# Patient Record
Sex: MALE | Race: WHITE
[De-identification: names, ages, dates, MRNs, and addresses within clinical notes are randomized per-mention and may not be internally consistent; named-entity substitution may affect disease eponyms.]

---

## 2017-03-19 ENCOUNTER — HOSPITAL ENCOUNTER (INPATIENT)
Dept: HOSPITAL 8 - ED | Age: 57
LOS: 2 days | Discharge: HOME | DRG: 175 | End: 2017-03-21
Attending: HOSPITALIST | Admitting: HOSPITALIST
Payer: OTHER GOVERNMENT

## 2017-03-19 VITALS — HEIGHT: 73 IN | WEIGHT: 315 LBS | BODY MASS INDEX: 41.75 KG/M2

## 2017-03-19 VITALS — DIASTOLIC BLOOD PRESSURE: 66 MMHG | SYSTOLIC BLOOD PRESSURE: 139 MMHG

## 2017-03-19 DIAGNOSIS — D68.69: ICD-10-CM

## 2017-03-19 DIAGNOSIS — Z95.5: ICD-10-CM

## 2017-03-19 DIAGNOSIS — J98.11: ICD-10-CM

## 2017-03-19 DIAGNOSIS — Z79.82: ICD-10-CM

## 2017-03-19 DIAGNOSIS — E78.5: ICD-10-CM

## 2017-03-19 DIAGNOSIS — N18.2: ICD-10-CM

## 2017-03-19 DIAGNOSIS — I25.10: ICD-10-CM

## 2017-03-19 DIAGNOSIS — J96.00: ICD-10-CM

## 2017-03-19 DIAGNOSIS — I26.99: Primary | ICD-10-CM

## 2017-03-19 DIAGNOSIS — E44.1: ICD-10-CM

## 2017-03-19 DIAGNOSIS — E66.01: ICD-10-CM

## 2017-03-19 DIAGNOSIS — I13.10: ICD-10-CM

## 2017-03-19 DIAGNOSIS — I77.819: ICD-10-CM

## 2017-03-19 DIAGNOSIS — Z79.4: ICD-10-CM

## 2017-03-19 DIAGNOSIS — E11.65: ICD-10-CM

## 2017-03-19 DIAGNOSIS — I25.2: ICD-10-CM

## 2017-03-19 LAB
AST SERPL-CCNC: 29 U/L (ref 15–37)
BUN SERPL-MCNC: 17 MG/DL (ref 7–18)
HGB BLD-MCNC: 15.9 G/DL (ref 13.7–18)
IS PT STATUS REG ER OR PRE ER?: NO
IS PT STATUS REG ER OR PRE ER?: YES

## 2017-03-19 PROCEDURE — 85520 HEPARIN ASSAY: CPT

## 2017-03-19 PROCEDURE — 85670 THROMBIN TIME PLASMA: CPT

## 2017-03-19 PROCEDURE — 85610 PROTHROMBIN TIME: CPT

## 2017-03-19 PROCEDURE — 36415 COLL VENOUS BLD VENIPUNCTURE: CPT

## 2017-03-19 PROCEDURE — 86147 CARDIOLIPIN ANTIBODY EA IG: CPT

## 2017-03-19 PROCEDURE — 85025 COMPLETE CBC W/AUTO DIFF WBC: CPT

## 2017-03-19 PROCEDURE — 85598 HEXAGNAL PHOSPH PLTLT NEUTRL: CPT

## 2017-03-19 PROCEDURE — 83735 ASSAY OF MAGNESIUM: CPT

## 2017-03-19 PROCEDURE — 96374 THER/PROPH/DIAG INJ IV PUSH: CPT

## 2017-03-19 PROCEDURE — 85732 THROMBOPLASTIN TIME PARTIAL: CPT

## 2017-03-19 PROCEDURE — 85730 THROMBOPLASTIN TIME PARTIAL: CPT

## 2017-03-19 PROCEDURE — 86146 BETA-2 GLYCOPROTEIN ANTIBODY: CPT

## 2017-03-19 PROCEDURE — 93970 EXTREMITY STUDY: CPT

## 2017-03-19 PROCEDURE — 84484 ASSAY OF TROPONIN QUANT: CPT

## 2017-03-19 PROCEDURE — 71010: CPT

## 2017-03-19 PROCEDURE — C8929 TTE W OR WO FOL WCON,DOPPLER: HCPCS

## 2017-03-19 PROCEDURE — 85306 CLOT INHIBIT PROT S FREE: CPT

## 2017-03-19 PROCEDURE — 80048 BASIC METABOLIC PNL TOTAL CA: CPT

## 2017-03-19 PROCEDURE — 83880 ASSAY OF NATRIURETIC PEPTIDE: CPT

## 2017-03-19 PROCEDURE — 71275 CT ANGIOGRAPHY CHEST: CPT

## 2017-03-19 PROCEDURE — 85303 CLOT INHIBIT PROT C ACTIVITY: CPT

## 2017-03-19 PROCEDURE — 85613 RUSSELL VIPER VENOM DILUTED: CPT

## 2017-03-19 PROCEDURE — 83036 HEMOGLOBIN GLYCOSYLATED A1C: CPT

## 2017-03-19 PROCEDURE — 80053 COMPREHEN METABOLIC PANEL: CPT

## 2017-03-19 PROCEDURE — 93005 ELECTROCARDIOGRAM TRACING: CPT

## 2017-03-19 PROCEDURE — 81241 F5 GENE: CPT

## 2017-03-19 PROCEDURE — 85379 FIBRIN DEGRADATION QUANT: CPT

## 2017-03-19 PROCEDURE — 82962 GLUCOSE BLOOD TEST: CPT

## 2017-03-20 VITALS — DIASTOLIC BLOOD PRESSURE: 80 MMHG | SYSTOLIC BLOOD PRESSURE: 125 MMHG

## 2017-03-20 VITALS — DIASTOLIC BLOOD PRESSURE: 71 MMHG | SYSTOLIC BLOOD PRESSURE: 105 MMHG

## 2017-03-20 VITALS — SYSTOLIC BLOOD PRESSURE: 119 MMHG | DIASTOLIC BLOOD PRESSURE: 82 MMHG

## 2017-03-20 VITALS — SYSTOLIC BLOOD PRESSURE: 101 MMHG | DIASTOLIC BLOOD PRESSURE: 74 MMHG

## 2017-03-20 LAB
AST SERPL-CCNC: 33 U/L (ref 15–37)
BUN SERPL-MCNC: 20 MG/DL (ref 7–18)
HGB BLD-MCNC: 15.2 G/DL (ref 13.7–18)
IS PT STATUS REG ER OR PRE ER?: NO

## 2017-03-20 RX ADMIN — INSULIN ASPART SCH UNITS: 100 INJECTION, SOLUTION INTRAVENOUS; SUBCUTANEOUS at 09:20

## 2017-03-20 RX ADMIN — INSULIN ASPART SCH UNITS: 100 INJECTION, SOLUTION INTRAVENOUS; SUBCUTANEOUS at 11:53

## 2017-03-20 RX ADMIN — HYDROCHLOROTHIAZIDE SCH MG: 12.5 CAPSULE ORAL at 09:23

## 2017-03-20 RX ADMIN — INSULIN ASPART SCH UNITS: 100 INJECTION, SOLUTION INTRAVENOUS; SUBCUTANEOUS at 17:45

## 2017-03-20 RX ADMIN — DOCUSATE SODIUM 50MG AND SENNOSIDES 8.6MG SCH TAB: 8.6; 5 TABLET, FILM COATED ORAL at 09:23

## 2017-03-20 RX ADMIN — SODIUM CHLORIDE, PRESERVATIVE FREE SCH ML: 5 INJECTION INTRAVENOUS at 00:33

## 2017-03-20 RX ADMIN — RIVAROXABAN SCH MG: 15 TABLET, FILM COATED ORAL at 11:52

## 2017-03-20 RX ADMIN — VALSARTAN SCH MG: 80 TABLET ORAL at 10:10

## 2017-03-20 RX ADMIN — ASPIRIN SCH MG: 81 TABLET, COATED ORAL at 09:23

## 2017-03-20 RX ADMIN — FAMOTIDINE SCH MG: 20 TABLET, FILM COATED ORAL at 00:32

## 2017-03-20 RX ADMIN — INSULIN ASPART SCH UNITS: 100 INJECTION, SOLUTION INTRAVENOUS; SUBCUTANEOUS at 22:48

## 2017-03-20 RX ADMIN — METOPROLOL TARTRATE SCH MG: 25 TABLET, FILM COATED ORAL at 09:23

## 2017-03-20 RX ADMIN — SIMVASTATIN SCH MG: 20 TABLET, FILM COATED ORAL at 00:32

## 2017-03-20 RX ADMIN — SODIUM CHLORIDE, PRESERVATIVE FREE SCH ML: 5 INJECTION INTRAVENOUS at 21:32

## 2017-03-20 RX ADMIN — SIMVASTATIN SCH MG: 20 TABLET, FILM COATED ORAL at 21:31

## 2017-03-20 RX ADMIN — ATORVASTATIN CALCIUM SCH MG: 80 TABLET, FILM COATED ORAL at 09:23

## 2017-03-20 RX ADMIN — FAMOTIDINE SCH MG: 20 TABLET, FILM COATED ORAL at 21:31

## 2017-03-20 RX ADMIN — METOPROLOL TARTRATE SCH MG: 25 TABLET, FILM COATED ORAL at 21:31

## 2017-03-20 RX ADMIN — FENOFIBRATE SCH MG: 145 TABLET, FILM COATED ORAL at 09:22

## 2017-03-20 RX ADMIN — SODIUM CHLORIDE, PRESERVATIVE FREE SCH ML: 5 INJECTION INTRAVENOUS at 09:00

## 2017-03-20 RX ADMIN — METOPROLOL TARTRATE SCH MG: 25 TABLET, FILM COATED ORAL at 00:32

## 2017-03-20 RX ADMIN — RIVAROXABAN SCH MG: 15 TABLET, FILM COATED ORAL at 17:45

## 2017-03-21 VITALS — SYSTOLIC BLOOD PRESSURE: 114 MMHG | DIASTOLIC BLOOD PRESSURE: 69 MMHG

## 2017-03-21 VITALS — DIASTOLIC BLOOD PRESSURE: 82 MMHG | SYSTOLIC BLOOD PRESSURE: 123 MMHG

## 2017-03-21 VITALS — DIASTOLIC BLOOD PRESSURE: 75 MMHG | SYSTOLIC BLOOD PRESSURE: 123 MMHG

## 2017-03-21 LAB — BUN SERPL-MCNC: 19 MG/DL (ref 7–18)

## 2017-03-21 RX ADMIN — HYDROCHLOROTHIAZIDE SCH MG: 12.5 CAPSULE ORAL at 09:21

## 2017-03-21 RX ADMIN — ASPIRIN SCH MG: 81 TABLET, COATED ORAL at 09:19

## 2017-03-21 RX ADMIN — DOCUSATE SODIUM 50MG AND SENNOSIDES 8.6MG SCH TAB: 8.6; 5 TABLET, FILM COATED ORAL at 09:00

## 2017-03-21 RX ADMIN — RIVAROXABAN SCH MG: 15 TABLET, FILM COATED ORAL at 16:21

## 2017-03-21 RX ADMIN — INSULIN ASPART SCH UNITS: 100 INJECTION, SOLUTION INTRAVENOUS; SUBCUTANEOUS at 16:21

## 2017-03-21 RX ADMIN — METOPROLOL TARTRATE SCH MG: 25 TABLET, FILM COATED ORAL at 09:19

## 2017-03-21 RX ADMIN — FENOFIBRATE SCH MG: 145 TABLET, FILM COATED ORAL at 09:19

## 2017-03-21 RX ADMIN — ATORVASTATIN CALCIUM SCH MG: 80 TABLET, FILM COATED ORAL at 09:19

## 2017-03-21 RX ADMIN — INSULIN ASPART SCH UNITS: 100 INJECTION, SOLUTION INTRAVENOUS; SUBCUTANEOUS at 07:00

## 2017-03-21 RX ADMIN — RIVAROXABAN SCH MG: 15 TABLET, FILM COATED ORAL at 09:20

## 2017-03-21 RX ADMIN — VALSARTAN SCH MG: 80 TABLET ORAL at 09:20

## 2017-03-21 RX ADMIN — SODIUM CHLORIDE, PRESERVATIVE FREE SCH ML: 5 INJECTION INTRAVENOUS at 09:20

## 2017-03-21 RX ADMIN — INSULIN ASPART SCH UNITS: 100 INJECTION, SOLUTION INTRAVENOUS; SUBCUTANEOUS at 13:02

## 2017-03-24 LAB
APTT BLD: 24.8 SEC
Lab: (no result) SEC
PROTHROMBIN TIME: 12 SEC
SCREEN DRVVT/NORMAL: 1.1 RATIO

## 2020-05-13 ENCOUNTER — HOSPITAL ENCOUNTER (OUTPATIENT)
Dept: HOSPITAL 8 - CFH | Age: 60
Discharge: HOME | End: 2020-05-13
Attending: INTERNAL MEDICINE
Payer: OTHER GOVERNMENT

## 2020-05-13 DIAGNOSIS — Z95.5: ICD-10-CM

## 2020-05-13 DIAGNOSIS — I25.89: Primary | ICD-10-CM

## 2020-05-13 DIAGNOSIS — I10: ICD-10-CM

## 2020-05-13 DIAGNOSIS — Z82.49: ICD-10-CM

## 2020-05-13 PROCEDURE — 93017 CV STRESS TEST TRACING ONLY: CPT

## 2020-05-13 PROCEDURE — A9502 TC99M TETROFOSMIN: HCPCS

## 2020-05-13 PROCEDURE — 78452 HT MUSCLE IMAGE SPECT MULT: CPT

## 2020-06-04 ENCOUNTER — HOSPITAL ENCOUNTER (OUTPATIENT)
Dept: HOSPITAL 8 - CVU | Age: 60
Discharge: HOME | End: 2020-06-04
Attending: INTERNAL MEDICINE
Payer: OTHER GOVERNMENT

## 2020-06-04 DIAGNOSIS — I08.0: Primary | ICD-10-CM

## 2020-06-04 DIAGNOSIS — E11.9: ICD-10-CM

## 2020-06-04 DIAGNOSIS — Z82.49: ICD-10-CM

## 2020-06-04 DIAGNOSIS — Z95.5: ICD-10-CM

## 2020-06-04 DIAGNOSIS — E78.5: ICD-10-CM

## 2020-06-04 DIAGNOSIS — I10: ICD-10-CM

## 2020-06-04 PROCEDURE — 93306 TTE W/DOPPLER COMPLETE: CPT

## 2020-07-31 ENCOUNTER — HOSPITAL ENCOUNTER (OUTPATIENT)
Dept: HOSPITAL 8 - CACL | Age: 60
Discharge: HOME | End: 2020-07-31
Attending: INTERNAL MEDICINE
Payer: OTHER GOVERNMENT

## 2020-07-31 VITALS — BODY MASS INDEX: 41.75 KG/M2 | HEIGHT: 73 IN | WEIGHT: 315 LBS

## 2020-07-31 VITALS — SYSTOLIC BLOOD PRESSURE: 143 MMHG | DIASTOLIC BLOOD PRESSURE: 71 MMHG

## 2020-07-31 DIAGNOSIS — Z79.01: ICD-10-CM

## 2020-07-31 DIAGNOSIS — I25.2: ICD-10-CM

## 2020-07-31 DIAGNOSIS — Z79.4: ICD-10-CM

## 2020-07-31 DIAGNOSIS — Z95.5: ICD-10-CM

## 2020-07-31 DIAGNOSIS — Z82.49: ICD-10-CM

## 2020-07-31 DIAGNOSIS — E11.9: ICD-10-CM

## 2020-07-31 DIAGNOSIS — I10: ICD-10-CM

## 2020-07-31 DIAGNOSIS — E78.2: ICD-10-CM

## 2020-07-31 DIAGNOSIS — Z86.711: ICD-10-CM

## 2020-07-31 DIAGNOSIS — Z79.82: ICD-10-CM

## 2020-07-31 DIAGNOSIS — Z79.899: ICD-10-CM

## 2020-07-31 DIAGNOSIS — I25.10: Primary | ICD-10-CM

## 2020-07-31 LAB
ANION GAP SERPL CALC-SCNC: 7 MMOL/L (ref 5–15)
BASOPHILS # BLD AUTO: 0.04 X10^3/UL (ref 0–0.1)
BASOPHILS NFR BLD AUTO: 1 % (ref 0–1)
CALCIUM SERPL-MCNC: 8.9 MG/DL (ref 8.5–10.1)
CHLORIDE SERPL-SCNC: 110 MMOL/L (ref 98–107)
CREAT SERPL-MCNC: 1.45 MG/DL (ref 0.7–1.3)
EOSINOPHIL # BLD AUTO: 0.19 X10^3/UL (ref 0–0.4)
EOSINOPHIL NFR BLD AUTO: 3 % (ref 1–7)
ERYTHROCYTE [DISTWIDTH] IN BLOOD BY AUTOMATED COUNT: 14.9 % (ref 9.4–14.8)
LYMPHOCYTES # BLD AUTO: 2 X10^3/UL (ref 1–3.4)
LYMPHOCYTES NFR BLD AUTO: 30 % (ref 22–44)
MCH RBC QN AUTO: 27.8 PG (ref 27.5–34.5)
MCHC RBC AUTO-ENTMCNC: 33.3 G/DL (ref 33.2–36.2)
MCV RBC AUTO: 83.4 FL (ref 81–97)
MD: NO
MONOCYTES # BLD AUTO: 0.37 X10^3/UL (ref 0.2–0.8)
MONOCYTES NFR BLD AUTO: 6 % (ref 2–9)
NEUTROPHILS # BLD AUTO: 3.98 X10^3/UL (ref 1.8–6.8)
NEUTROPHILS NFR BLD AUTO: 61 % (ref 42–75)
PLATELET # BLD AUTO: 260 X10^3/UL (ref 130–400)
PMV BLD AUTO: 9 FL (ref 7.4–10.4)
RBC # BLD AUTO: 4.77 X10^6/UL (ref 4.38–5.82)

## 2020-07-31 PROCEDURE — 36415 COLL VENOUS BLD VENIPUNCTURE: CPT

## 2020-07-31 PROCEDURE — 93458 L HRT ARTERY/VENTRICLE ANGIO: CPT

## 2020-07-31 PROCEDURE — 85025 COMPLETE CBC W/AUTO DIFF WBC: CPT

## 2020-07-31 PROCEDURE — 80048 BASIC METABOLIC PNL TOTAL CA: CPT

## 2020-07-31 PROCEDURE — C1894 INTRO/SHEATH, NON-LASER: HCPCS

## 2020-07-31 PROCEDURE — 99156 MOD SED OTH PHYS/QHP 5/>YRS: CPT

## 2020-07-31 PROCEDURE — 93571 IV DOP VEL&/PRESS C FLO 1ST: CPT

## 2020-07-31 PROCEDURE — 99157 MOD SED OTHER PHYS/QHP EA: CPT

## 2020-07-31 PROCEDURE — C1769 GUIDE WIRE: HCPCS

## 2020-12-05 ENCOUNTER — HOSPITAL ENCOUNTER (INPATIENT)
Dept: HOSPITAL 8 - ED | Age: 60
LOS: 6 days | DRG: 177 | End: 2020-12-11
Attending: INTERNAL MEDICINE | Admitting: INTERNAL MEDICINE
Payer: OTHER GOVERNMENT

## 2020-12-05 VITALS — SYSTOLIC BLOOD PRESSURE: 146 MMHG | DIASTOLIC BLOOD PRESSURE: 81 MMHG

## 2020-12-05 VITALS — DIASTOLIC BLOOD PRESSURE: 80 MMHG | SYSTOLIC BLOOD PRESSURE: 148 MMHG

## 2020-12-05 VITALS — BODY MASS INDEX: 41.75 KG/M2 | HEIGHT: 73 IN | WEIGHT: 315 LBS

## 2020-12-05 VITALS — SYSTOLIC BLOOD PRESSURE: 132 MMHG | DIASTOLIC BLOOD PRESSURE: 78 MMHG

## 2020-12-05 DIAGNOSIS — I10: ICD-10-CM

## 2020-12-05 DIAGNOSIS — E78.5: ICD-10-CM

## 2020-12-05 DIAGNOSIS — J96.01: ICD-10-CM

## 2020-12-05 DIAGNOSIS — Z91.19: ICD-10-CM

## 2020-12-05 DIAGNOSIS — Z86.711: ICD-10-CM

## 2020-12-05 DIAGNOSIS — E11.65: ICD-10-CM

## 2020-12-05 DIAGNOSIS — J12.89: ICD-10-CM

## 2020-12-05 DIAGNOSIS — Z79.82: ICD-10-CM

## 2020-12-05 DIAGNOSIS — Z79.899: ICD-10-CM

## 2020-12-05 DIAGNOSIS — Z79.01: ICD-10-CM

## 2020-12-05 DIAGNOSIS — E66.01: ICD-10-CM

## 2020-12-05 DIAGNOSIS — D68.59: ICD-10-CM

## 2020-12-05 DIAGNOSIS — N17.9: ICD-10-CM

## 2020-12-05 DIAGNOSIS — U07.1: Primary | ICD-10-CM

## 2020-12-05 DIAGNOSIS — Z79.4: ICD-10-CM

## 2020-12-05 DIAGNOSIS — I25.10: ICD-10-CM

## 2020-12-05 LAB
ACETONE EXG QL: < 0.19 UG/MLFEU (ref 0–0.52)
ALBUMIN SERPL-MCNC: 2.8 G/DL (ref 3.4–5)
ALP SERPL-CCNC: 39 U/L (ref 45–117)
ALT SERPL-CCNC: 69 U/L (ref 12–78)
ANION GAP SERPL CALC-SCNC: 8 MMOL/L (ref 5–15)
APTT BLD: 31 SECONDS (ref 25–31)
BASOPHILS # BLD AUTO: 0 X10^3/UL (ref 0–0.1)
BASOPHILS NFR BLD AUTO: 1 % (ref 0–1)
BILIRUB SERPL-MCNC: 0.7 MG/DL (ref 0.2–1)
CALCIUM SERPL-MCNC: 8.6 MG/DL (ref 8.5–10.1)
CHLORIDE SERPL-SCNC: 102 MMOL/L (ref 98–107)
CREAT SERPL-MCNC: 1.63 MG/DL (ref 0.7–1.3)
CRP SERPL-MCNC: 11.2 MG/DL (ref 0.02–0.49)
EOSINOPHIL # BLD AUTO: 0 X10^3/UL (ref 0–0.4)
EOSINOPHIL NFR BLD AUTO: 0 % (ref 1–7)
ERYTHROCYTE [DISTWIDTH] IN BLOOD BY AUTOMATED COUNT: 14.8 % (ref 9.4–14.8)
EST. AVERAGE GLUCOSE BLD GHB EST-MCNC: 143 MG/DL (ref 0–126)
FIBRINOGEN PPP-MCNC: 326 MG/DL (ref 200–340)
LYMPHOCYTES # BLD AUTO: 0.8 X10^3/UL (ref 1–3.4)
LYMPHOCYTES NFR BLD AUTO: 20 % (ref 22–44)
MCH RBC QN AUTO: 28.5 PG (ref 27.5–34.5)
MCHC RBC AUTO-ENTMCNC: 35.1 G/DL (ref 33.2–36.2)
MD: NO
MONOCYTES # BLD AUTO: 0.4 X10^3/UL (ref 0.2–0.8)
MONOCYTES NFR BLD AUTO: 9 % (ref 2–9)
NEUTROPHILS # BLD AUTO: 2.9 X10^3/UL (ref 1.8–6.8)
NEUTROPHILS NFR BLD AUTO: 71 % (ref 42–75)
PLATELET # BLD AUTO: 161 X10^3/UL (ref 130–400)
PLATELET (DIC): 161 X10^3/UL (ref 130–400)
PMV BLD AUTO: 8.1 FL (ref 7.4–10.4)
PROT SERPL-MCNC: 6.9 G/DL (ref 6.4–8.2)
PROTHROMBIN TIME: 11.4 SECONDS (ref 9.6–11.5)
RBC # BLD AUTO: 4.85 X10^6/UL (ref 4.38–5.82)
TROPONIN I SERPL-MCNC: 0.02 NG/ML (ref 0–0.04)
TROPONIN I SERPL-MCNC: 0.03 NG/ML (ref 0–0.04)
TROPONIN I SERPL-MCNC: 0.03 NG/ML (ref 0–0.04)

## 2020-12-05 PROCEDURE — 82728 ASSAY OF FERRITIN: CPT

## 2020-12-05 PROCEDURE — 36415 COLL VENOUS BLD VENIPUNCTURE: CPT

## 2020-12-05 PROCEDURE — 93306 TTE W/DOPPLER COMPLETE: CPT

## 2020-12-05 PROCEDURE — 84484 ASSAY OF TROPONIN QUANT: CPT

## 2020-12-05 PROCEDURE — 82962 GLUCOSE BLOOD TEST: CPT

## 2020-12-05 PROCEDURE — 83605 ASSAY OF LACTIC ACID: CPT

## 2020-12-05 PROCEDURE — 93005 ELECTROCARDIOGRAM TRACING: CPT

## 2020-12-05 PROCEDURE — 85379 FIBRIN DEGRADATION QUANT: CPT

## 2020-12-05 PROCEDURE — 87070 CULTURE OTHR SPECIMN AEROBIC: CPT

## 2020-12-05 PROCEDURE — 86140 C-REACTIVE PROTEIN: CPT

## 2020-12-05 PROCEDURE — 85610 PROTHROMBIN TIME: CPT

## 2020-12-05 PROCEDURE — 71045 X-RAY EXAM CHEST 1 VIEW: CPT

## 2020-12-05 PROCEDURE — 85384 FIBRINOGEN ACTIVITY: CPT

## 2020-12-05 PROCEDURE — 92950 HEART/LUNG RESUSCITATION CPR: CPT

## 2020-12-05 PROCEDURE — 83036 HEMOGLOBIN GLYCOSYLATED A1C: CPT

## 2020-12-05 PROCEDURE — 80053 COMPREHEN METABOLIC PANEL: CPT

## 2020-12-05 PROCEDURE — 83615 LACTATE (LD) (LDH) ENZYME: CPT

## 2020-12-05 PROCEDURE — 85025 COMPLETE CBC W/AUTO DIFF WBC: CPT

## 2020-12-05 PROCEDURE — 84145 PROCALCITONIN (PCT): CPT

## 2020-12-05 PROCEDURE — 85049 AUTOMATED PLATELET COUNT: CPT

## 2020-12-05 PROCEDURE — 87205 SMEAR GRAM STAIN: CPT

## 2020-12-05 PROCEDURE — 83880 ASSAY OF NATRIURETIC PEPTIDE: CPT

## 2020-12-05 PROCEDURE — 87040 BLOOD CULTURE FOR BACTERIA: CPT

## 2020-12-05 PROCEDURE — 84443 ASSAY THYROID STIM HORMONE: CPT

## 2020-12-05 PROCEDURE — 80048 BASIC METABOLIC PNL TOTAL CA: CPT

## 2020-12-05 PROCEDURE — 85730 THROMBOPLASTIN TIME PARTIAL: CPT

## 2020-12-05 PROCEDURE — 99285 EMERGENCY DEPT VISIT HI MDM: CPT

## 2020-12-05 RX ADMIN — HYDROCHLOROTHIAZIDE SCH MG: 12.5 CAPSULE ORAL at 13:22

## 2020-12-05 RX ADMIN — DOCUSATE SODIUM 50MG AND SENNOSIDES 8.6MG SCH TAB: 8.6; 5 TABLET, FILM COATED ORAL at 09:00

## 2020-12-05 RX ADMIN — INSULIN LISPRO SCH UNITS: 100 INJECTION, SOLUTION INTRAVENOUS; SUBCUTANEOUS at 21:39

## 2020-12-05 RX ADMIN — RIVAROXABAN SCH MG: 20 TABLET, FILM COATED ORAL at 11:17

## 2020-12-05 RX ADMIN — Medication SCH MG: at 11:18

## 2020-12-05 RX ADMIN — GUAIFENESIN AND DEXTROMETHORPHAN SCH ML: 100; 10 SYRUP ORAL at 23:09

## 2020-12-05 RX ADMIN — ZINC SULFATE CAP 220 MG (50 MG ELEMENTAL ZN) SCH MG: 220 (50 ZN) CAP at 11:16

## 2020-12-05 RX ADMIN — OXYCODONE HYDROCHLORIDE AND ACETAMINOPHEN SCH MG: 500 TABLET ORAL at 17:15

## 2020-12-05 RX ADMIN — LOSARTAN POTASSIUM SCH MG: 50 TABLET, FILM COATED ORAL at 11:17

## 2020-12-05 RX ADMIN — Medication SCH MG: at 21:38

## 2020-12-05 RX ADMIN — OXYCODONE HYDROCHLORIDE AND ACETAMINOPHEN SCH MG: 500 TABLET ORAL at 11:17

## 2020-12-05 RX ADMIN — DOXYCYCLINE SCH MLS/HR: 100 INJECTION, POWDER, LYOPHILIZED, FOR SOLUTION INTRAVENOUS at 21:44

## 2020-12-05 RX ADMIN — ASPIRIN SCH MG: 81 TABLET, COATED ORAL at 11:18

## 2020-12-05 RX ADMIN — ATORVASTATIN CALCIUM SCH MG: 80 TABLET, FILM COATED ORAL at 11:18

## 2020-12-05 RX ADMIN — METOPROLOL TARTRATE SCH MG: 25 TABLET, FILM COATED ORAL at 11:17

## 2020-12-05 RX ADMIN — INSULIN LISPRO SCH UNITS: 100 INJECTION, SOLUTION INTRAVENOUS; SUBCUTANEOUS at 13:23

## 2020-12-05 RX ADMIN — INSULIN LISPRO SCH UNITS: 100 INJECTION, SOLUTION INTRAVENOUS; SUBCUTANEOUS at 17:27

## 2020-12-05 RX ADMIN — DOXYCYCLINE SCH MLS/HR: 100 INJECTION, POWDER, LYOPHILIZED, FOR SOLUTION INTRAVENOUS at 17:15

## 2020-12-05 RX ADMIN — CEFTRIAXONE SCH MLS/HR: 2 INJECTION, SOLUTION INTRAVENOUS at 16:05

## 2020-12-05 RX ADMIN — GUAIFENESIN AND DEXTROMETHORPHAN SCH ML: 100; 10 SYRUP ORAL at 17:15

## 2020-12-05 RX ADMIN — GUAIFENESIN AND DEXTROMETHORPHAN SCH ML: 100; 10 SYRUP ORAL at 13:21

## 2020-12-05 RX ADMIN — OXYCODONE HYDROCHLORIDE AND ACETAMINOPHEN SCH MG: 500 TABLET ORAL at 21:38

## 2020-12-05 RX ADMIN — CHOLECALCIFEROL TAB 125 MCG (5000 UNIT) SCH UNIT: 125 TAB at 11:18

## 2020-12-05 RX ADMIN — CIMETIDINE SCH MG: 200 TABLET, FILM COATED ORAL at 17:15

## 2020-12-05 RX ADMIN — SODIUM CHLORIDE, SODIUM LACTATE, POTASSIUM CHLORIDE, AND CALCIUM CHLORIDE SCH MLS/HR: .6; .31; .03; .02 INJECTION, SOLUTION INTRAVENOUS at 15:23

## 2020-12-05 RX ADMIN — FENOFIBRATE SCH MG: 145 TABLET, FILM COATED ORAL at 13:22

## 2020-12-05 RX ADMIN — METOPROLOL TARTRATE SCH MG: 25 TABLET, FILM COATED ORAL at 21:38

## 2020-12-05 NOTE — NUR
PT PRONED WITH PILLOWS PER REQUEST. "THIS IS THE ONLY WAY I CAN SLEEP" PT ON A 
HOSPITAL BED. WENT OVER PLAN OF  FROM ORDER LIST, AGREES TO PLAN.

## 2020-12-05 NOTE — NUR
TOOK REPORT FROM LANCE GONZALEZ, ASSUME CARE AT THIS TIME. PT SITTING IN BED 
REPORTS SOB SPO2 98 ON 4L NC. CALLED FOR A HOSPITAL BED.

## 2020-12-05 NOTE — NUR
pt with recent covid 19 diagnosis. pt states he bought an o2 monitor for home 
and this morning he woke with difficulty breathing. his O2 monitor said low 80s 
and so pt decided to come in. pt with very frequent dry cough and low O2 in the 
80 in traige. 2 L NC O2 applied to maintain O2 >90%

## 2020-12-06 VITALS — DIASTOLIC BLOOD PRESSURE: 73 MMHG | SYSTOLIC BLOOD PRESSURE: 130 MMHG

## 2020-12-06 VITALS — DIASTOLIC BLOOD PRESSURE: 66 MMHG | SYSTOLIC BLOOD PRESSURE: 107 MMHG

## 2020-12-06 VITALS — DIASTOLIC BLOOD PRESSURE: 74 MMHG | SYSTOLIC BLOOD PRESSURE: 125 MMHG

## 2020-12-06 VITALS — SYSTOLIC BLOOD PRESSURE: 151 MMHG | DIASTOLIC BLOOD PRESSURE: 84 MMHG

## 2020-12-06 LAB
ANION GAP SERPL CALC-SCNC: 6 MMOL/L (ref 5–15)
BASOPHILS # BLD AUTO: 0 X10^3/UL (ref 0–0.1)
BASOPHILS NFR BLD AUTO: 1 % (ref 0–1)
CALCIUM SERPL-MCNC: 8.5 MG/DL (ref 8.5–10.1)
CHLORIDE SERPL-SCNC: 104 MMOL/L (ref 98–107)
CREAT SERPL-MCNC: 1.53 MG/DL (ref 0.7–1.3)
EOSINOPHIL # BLD AUTO: 0 X10^3/UL (ref 0–0.4)
EOSINOPHIL NFR BLD AUTO: 0 % (ref 1–7)
ERYTHROCYTE [DISTWIDTH] IN BLOOD BY AUTOMATED COUNT: 14.9 % (ref 9.4–14.8)
LYMPHOCYTES # BLD AUTO: 0.5 X10^3/UL (ref 1–3.4)
LYMPHOCYTES NFR BLD AUTO: 17 % (ref 22–44)
MCH RBC QN AUTO: 28.2 PG (ref 27.5–34.5)
MCHC RBC AUTO-ENTMCNC: 34.5 G/DL (ref 33.2–36.2)
MD: (no result)
MONOCYTES # BLD AUTO: 0.3 X10^3/UL (ref 0.2–0.8)
MONOCYTES NFR BLD AUTO: 10 % (ref 2–9)
NEUTROPHILS # BLD AUTO: 2 X10^3/UL (ref 1.8–6.8)
NEUTROPHILS NFR BLD AUTO: 72 % (ref 42–75)
PLATELET # BLD AUTO: 167 X10^3/UL (ref 130–400)
PMV BLD AUTO: 8.1 FL (ref 7.4–10.4)
RBC # BLD AUTO: 4.65 X10^6/UL (ref 4.38–5.82)

## 2020-12-06 PROCEDURE — XW033E5 INTRODUCTION OF REMDESIVIR ANTI-INFECTIVE INTO PERIPHERAL VEIN, PERCUTANEOUS APPROACH, NEW TECHNOLOGY GROUP 5: ICD-10-PCS | Performed by: INTERNAL MEDICINE

## 2020-12-06 RX ADMIN — REMDESIVIR SCH MLS/HR: 5 INJECTION INTRAVENOUS at 08:59

## 2020-12-06 RX ADMIN — GUAIFENESIN AND DEXTROMETHORPHAN SCH ML: 100; 10 SYRUP ORAL at 11:19

## 2020-12-06 RX ADMIN — FENOFIBRATE SCH MG: 145 TABLET, FILM COATED ORAL at 09:01

## 2020-12-06 RX ADMIN — CHOLECALCIFEROL TAB 125 MCG (5000 UNIT) SCH UNIT: 125 TAB at 09:01

## 2020-12-06 RX ADMIN — SODIUM CHLORIDE, SODIUM LACTATE, POTASSIUM CHLORIDE, AND CALCIUM CHLORIDE SCH MLS/HR: .6; .31; .03; .02 INJECTION, SOLUTION INTRAVENOUS at 23:08

## 2020-12-06 RX ADMIN — FLUTICASONE FUROATE AND VILANTEROL TRIFENATATE SCH PUFF: 100; 25 POWDER RESPIRATORY (INHALATION) at 08:59

## 2020-12-06 RX ADMIN — INSULIN GLARGINE SCH UNITS: 100 INJECTION, SOLUTION SUBCUTANEOUS at 21:30

## 2020-12-06 RX ADMIN — INSULIN LISPRO SCH UNITS: 100 INJECTION, SOLUTION INTRAVENOUS; SUBCUTANEOUS at 08:59

## 2020-12-06 RX ADMIN — INSULIN LISPRO SCH UNITS: 100 INJECTION, SOLUTION INTRAVENOUS; SUBCUTANEOUS at 21:30

## 2020-12-06 RX ADMIN — OXYCODONE HYDROCHLORIDE AND ACETAMINOPHEN SCH MG: 500 TABLET ORAL at 09:01

## 2020-12-06 RX ADMIN — Medication SCH MG: at 21:28

## 2020-12-06 RX ADMIN — ASPIRIN SCH MG: 81 TABLET, COATED ORAL at 09:01

## 2020-12-06 RX ADMIN — ATORVASTATIN CALCIUM SCH MG: 80 TABLET, FILM COATED ORAL at 09:00

## 2020-12-06 RX ADMIN — Medication SCH MG: at 21:29

## 2020-12-06 RX ADMIN — CIMETIDINE SCH MG: 200 TABLET, FILM COATED ORAL at 09:01

## 2020-12-06 RX ADMIN — METOPROLOL TARTRATE SCH MG: 25 TABLET, FILM COATED ORAL at 09:01

## 2020-12-06 RX ADMIN — METOPROLOL TARTRATE SCH MG: 25 TABLET, FILM COATED ORAL at 21:29

## 2020-12-06 RX ADMIN — DOXYCYCLINE SCH MLS/HR: 100 INJECTION, POWDER, LYOPHILIZED, FOR SOLUTION INTRAVENOUS at 11:18

## 2020-12-06 RX ADMIN — GUAIFENESIN AND DEXTROMETHORPHAN SCH ML: 100; 10 SYRUP ORAL at 06:00

## 2020-12-06 RX ADMIN — GUAIFENESIN AND DEXTROMETHORPHAN SCH ML: 100; 10 SYRUP ORAL at 16:45

## 2020-12-06 RX ADMIN — INSULIN LISPRO SCH UNITS: 100 INJECTION, SOLUTION INTRAVENOUS; SUBCUTANEOUS at 16:48

## 2020-12-06 RX ADMIN — GUAIFENESIN AND DEXTROMETHORPHAN SCH ML: 100; 10 SYRUP ORAL at 23:16

## 2020-12-06 RX ADMIN — ZINC SULFATE CAP 220 MG (50 MG ELEMENTAL ZN) SCH MG: 220 (50 ZN) CAP at 09:01

## 2020-12-06 RX ADMIN — HYDROCHLOROTHIAZIDE SCH MG: 12.5 CAPSULE ORAL at 09:00

## 2020-12-06 RX ADMIN — DOXYCYCLINE HYCLATE SCH MG: 100 TABLET, FILM COATED ORAL at 21:28

## 2020-12-06 RX ADMIN — SODIUM CHLORIDE, SODIUM LACTATE, POTASSIUM CHLORIDE, AND CALCIUM CHLORIDE SCH MLS/HR: .6; .31; .03; .02 INJECTION, SOLUTION INTRAVENOUS at 04:00

## 2020-12-06 RX ADMIN — INSULIN GLARGINE SCH UNITS: 100 INJECTION, SOLUTION SUBCUTANEOUS at 12:52

## 2020-12-06 RX ADMIN — OXYCODONE HYDROCHLORIDE AND ACETAMINOPHEN SCH MG: 500 TABLET ORAL at 21:28

## 2020-12-06 RX ADMIN — DOCUSATE SODIUM 50MG AND SENNOSIDES 8.6MG SCH TAB: 8.6; 5 TABLET, FILM COATED ORAL at 09:02

## 2020-12-06 RX ADMIN — ACETAMINOPHEN PRN MG: 325 TABLET, FILM COATED ORAL at 11:39

## 2020-12-06 RX ADMIN — CEFTRIAXONE SCH MLS/HR: 2 INJECTION, SOLUTION INTRAVENOUS at 16:45

## 2020-12-06 RX ADMIN — Medication SCH MG: at 09:00

## 2020-12-06 RX ADMIN — RIVAROXABAN SCH MG: 20 TABLET, FILM COATED ORAL at 09:01

## 2020-12-06 RX ADMIN — OXYCODONE HYDROCHLORIDE AND ACETAMINOPHEN SCH MG: 500 TABLET ORAL at 16:45

## 2020-12-06 RX ADMIN — LOSARTAN POTASSIUM SCH MG: 50 TABLET, FILM COATED ORAL at 09:01

## 2020-12-07 VITALS — DIASTOLIC BLOOD PRESSURE: 80 MMHG | SYSTOLIC BLOOD PRESSURE: 157 MMHG

## 2020-12-07 VITALS — DIASTOLIC BLOOD PRESSURE: 70 MMHG | SYSTOLIC BLOOD PRESSURE: 122 MMHG

## 2020-12-07 VITALS — DIASTOLIC BLOOD PRESSURE: 84 MMHG | SYSTOLIC BLOOD PRESSURE: 151 MMHG

## 2020-12-07 VITALS — SYSTOLIC BLOOD PRESSURE: 148 MMHG | DIASTOLIC BLOOD PRESSURE: 83 MMHG

## 2020-12-07 LAB
ALBUMIN SERPL-MCNC: 2.4 G/DL (ref 3.4–5)
ALP SERPL-CCNC: 37 U/L (ref 45–117)
ALT SERPL-CCNC: 59 U/L (ref 12–78)
ANION GAP SERPL CALC-SCNC: 5 MMOL/L (ref 5–15)
BASOPHILS # BLD AUTO: 0 X10^3/UL (ref 0–0.1)
BASOPHILS NFR BLD AUTO: 0 % (ref 0–1)
BILIRUB SERPL-MCNC: 0.4 MG/DL (ref 0.2–1)
CALCIUM SERPL-MCNC: 8.6 MG/DL (ref 8.5–10.1)
CHLORIDE SERPL-SCNC: 106 MMOL/L (ref 98–107)
CREAT SERPL-MCNC: 1.21 MG/DL (ref 0.7–1.3)
EOSINOPHIL # BLD AUTO: 0 X10^3/UL (ref 0–0.4)
EOSINOPHIL NFR BLD AUTO: 0 % (ref 1–7)
ERYTHROCYTE [DISTWIDTH] IN BLOOD BY AUTOMATED COUNT: 14.6 % (ref 9.4–14.8)
LYMPHOCYTES # BLD AUTO: 0.7 X10^3/UL (ref 1–3.4)
LYMPHOCYTES NFR BLD AUTO: 10 % (ref 22–44)
MCH RBC QN AUTO: 28.5 PG (ref 27.5–34.5)
MCHC RBC AUTO-ENTMCNC: 35.1 G/DL (ref 33.2–36.2)
MD: NO
MONOCYTES # BLD AUTO: 0.5 X10^3/UL (ref 0.2–0.8)
MONOCYTES NFR BLD AUTO: 8 % (ref 2–9)
NEUTROPHILS # BLD AUTO: 5.6 X10^3/UL (ref 1.8–6.8)
NEUTROPHILS NFR BLD AUTO: 82 % (ref 42–75)
PLATELET # BLD AUTO: 204 X10^3/UL (ref 130–400)
PMV BLD AUTO: 8.3 FL (ref 7.4–10.4)
PROT SERPL-MCNC: 6 G/DL (ref 6.4–8.2)
RBC # BLD AUTO: 4.75 X10^6/UL (ref 4.38–5.82)

## 2020-12-07 RX ADMIN — REMDESIVIR SCH MLS/HR: 5 INJECTION INTRAVENOUS at 09:32

## 2020-12-07 RX ADMIN — DOXYCYCLINE HYCLATE SCH MG: 100 TABLET, FILM COATED ORAL at 09:34

## 2020-12-07 RX ADMIN — RIVAROXABAN SCH MG: 20 TABLET, FILM COATED ORAL at 09:34

## 2020-12-07 RX ADMIN — INSULIN LISPRO SCH UNITS: 100 INJECTION, SOLUTION INTRAVENOUS; SUBCUTANEOUS at 21:01

## 2020-12-07 RX ADMIN — CHOLECALCIFEROL TAB 125 MCG (5000 UNIT) SCH UNIT: 125 TAB at 09:34

## 2020-12-07 RX ADMIN — INSULIN LISPRO SCH UNITS: 100 INJECTION, SOLUTION INTRAVENOUS; SUBCUTANEOUS at 17:16

## 2020-12-07 RX ADMIN — ATORVASTATIN CALCIUM SCH MG: 80 TABLET, FILM COATED ORAL at 09:34

## 2020-12-07 RX ADMIN — GUAIFENESIN AND DEXTROMETHORPHAN SCH ML: 100; 10 SYRUP ORAL at 12:16

## 2020-12-07 RX ADMIN — ACETAMINOPHEN PRN MG: 325 TABLET, FILM COATED ORAL at 05:36

## 2020-12-07 RX ADMIN — DOCUSATE SODIUM 50MG AND SENNOSIDES 8.6MG SCH TAB: 8.6; 5 TABLET, FILM COATED ORAL at 09:31

## 2020-12-07 RX ADMIN — SODIUM CHLORIDE, SODIUM LACTATE, POTASSIUM CHLORIDE, AND CALCIUM CHLORIDE SCH MLS/HR: .6; .31; .03; .02 INJECTION, SOLUTION INTRAVENOUS at 12:16

## 2020-12-07 RX ADMIN — INSULIN LISPRO SCH UNITS: 100 INJECTION, SOLUTION INTRAVENOUS; SUBCUTANEOUS at 12:15

## 2020-12-07 RX ADMIN — LOSARTAN POTASSIUM SCH MG: 50 TABLET, FILM COATED ORAL at 09:35

## 2020-12-07 RX ADMIN — ASPIRIN SCH MG: 81 TABLET, COATED ORAL at 09:34

## 2020-12-07 RX ADMIN — Medication SCH MG: at 20:51

## 2020-12-07 RX ADMIN — BUTALBITAL, ACETAMINOPHEN, AND CAFFEINE PRN TAB: 50; 325; 40 TABLET ORAL at 17:31

## 2020-12-07 RX ADMIN — GUAIFENESIN AND DEXTROMETHORPHAN SCH ML: 100; 10 SYRUP ORAL at 17:14

## 2020-12-07 RX ADMIN — FENOFIBRATE SCH MG: 145 TABLET, FILM COATED ORAL at 09:34

## 2020-12-07 RX ADMIN — CEFTRIAXONE SCH MLS/HR: 2 INJECTION, SOLUTION INTRAVENOUS at 17:14

## 2020-12-07 RX ADMIN — OXYCODONE HYDROCHLORIDE AND ACETAMINOPHEN SCH MG: 500 TABLET ORAL at 09:34

## 2020-12-07 RX ADMIN — ACETAMINOPHEN PRN MG: 325 TABLET, FILM COATED ORAL at 09:32

## 2020-12-07 RX ADMIN — METOPROLOL TARTRATE SCH MG: 25 TABLET, FILM COATED ORAL at 20:51

## 2020-12-07 RX ADMIN — HYDROCHLOROTHIAZIDE SCH MG: 12.5 CAPSULE ORAL at 09:34

## 2020-12-07 RX ADMIN — INSULIN LISPRO SCH UNITS: 100 INJECTION, SOLUTION INTRAVENOUS; SUBCUTANEOUS at 09:30

## 2020-12-07 RX ADMIN — FLUTICASONE FUROATE AND VILANTEROL TRIFENATATE SCH PUFF: 100; 25 POWDER RESPIRATORY (INHALATION) at 09:32

## 2020-12-07 RX ADMIN — OXYCODONE HYDROCHLORIDE AND ACETAMINOPHEN SCH MG: 500 TABLET ORAL at 20:52

## 2020-12-07 RX ADMIN — GUAIFENESIN AND DEXTROMETHORPHAN SCH ML: 100; 10 SYRUP ORAL at 05:21

## 2020-12-07 RX ADMIN — ZINC SULFATE CAP 220 MG (50 MG ELEMENTAL ZN) SCH MG: 220 (50 ZN) CAP at 09:35

## 2020-12-07 RX ADMIN — CIMETIDINE SCH MG: 200 TABLET, FILM COATED ORAL at 09:34

## 2020-12-07 RX ADMIN — DOXYCYCLINE HYCLATE SCH MG: 100 TABLET, FILM COATED ORAL at 20:50

## 2020-12-07 RX ADMIN — OXYCODONE HYDROCHLORIDE AND ACETAMINOPHEN SCH MG: 500 TABLET ORAL at 17:14

## 2020-12-07 RX ADMIN — METOPROLOL TARTRATE SCH MG: 25 TABLET, FILM COATED ORAL at 09:34

## 2020-12-07 RX ADMIN — Medication SCH MG: at 09:34

## 2020-12-07 RX ADMIN — BUTALBITAL, ACETAMINOPHEN, AND CAFFEINE PRN TAB: 50; 325; 40 TABLET ORAL at 10:18

## 2020-12-08 VITALS — DIASTOLIC BLOOD PRESSURE: 70 MMHG | SYSTOLIC BLOOD PRESSURE: 145 MMHG

## 2020-12-08 VITALS — DIASTOLIC BLOOD PRESSURE: 70 MMHG | SYSTOLIC BLOOD PRESSURE: 137 MMHG

## 2020-12-08 VITALS — DIASTOLIC BLOOD PRESSURE: 82 MMHG | SYSTOLIC BLOOD PRESSURE: 151 MMHG

## 2020-12-08 VITALS — DIASTOLIC BLOOD PRESSURE: 85 MMHG | SYSTOLIC BLOOD PRESSURE: 153 MMHG

## 2020-12-08 LAB
ALBUMIN SERPL-MCNC: 2.3 G/DL (ref 3.4–5)
ALP SERPL-CCNC: 39 U/L (ref 45–117)
ALT SERPL-CCNC: 48 U/L (ref 12–78)
ANION GAP SERPL CALC-SCNC: 8 MMOL/L (ref 5–15)
BASOPHILS # BLD AUTO: 0 X10^3/UL (ref 0–0.1)
BASOPHILS NFR BLD AUTO: 0 % (ref 0–1)
BILIRUB SERPL-MCNC: 0.4 MG/DL (ref 0.2–1)
CALCIUM SERPL-MCNC: 8.3 MG/DL (ref 8.5–10.1)
CHLORIDE SERPL-SCNC: 106 MMOL/L (ref 98–107)
CREAT SERPL-MCNC: 1.11 MG/DL (ref 0.7–1.3)
CRP SERPL-MCNC: 12 MG/DL (ref 0.02–0.49)
EOSINOPHIL # BLD AUTO: 0 X10^3/UL (ref 0–0.4)
EOSINOPHIL NFR BLD AUTO: 0 % (ref 1–7)
ERYTHROCYTE [DISTWIDTH] IN BLOOD BY AUTOMATED COUNT: 14.7 % (ref 9.4–14.8)
LYMPHOCYTES # BLD AUTO: 1 X10^3/UL (ref 1–3.4)
LYMPHOCYTES NFR BLD AUTO: 10 % (ref 22–44)
MCH RBC QN AUTO: 28.1 PG (ref 27.5–34.5)
MCHC RBC AUTO-ENTMCNC: 34.6 G/DL (ref 33.2–36.2)
MD: NO
MONOCYTES # BLD AUTO: 0.5 X10^3/UL (ref 0.2–0.8)
MONOCYTES NFR BLD AUTO: 6 % (ref 2–9)
NEUTROPHILS # BLD AUTO: 7.8 X10^3/UL (ref 1.8–6.8)
NEUTROPHILS NFR BLD AUTO: 84 % (ref 42–75)
PLATELET # BLD AUTO: 233 X10^3/UL (ref 130–400)
PMV BLD AUTO: 8.3 FL (ref 7.4–10.4)
PROT SERPL-MCNC: 5.2 G/DL (ref 6.4–8.2)
RBC # BLD AUTO: 4.69 X10^6/UL (ref 4.38–5.82)

## 2020-12-08 RX ADMIN — Medication SCH MG: at 21:20

## 2020-12-08 RX ADMIN — GUAIFENESIN AND DEXTROMETHORPHAN SCH ML: 100; 10 SYRUP ORAL at 00:20

## 2020-12-08 RX ADMIN — DEXAMETHASONE SODIUM PHOSPHATE SCH MG: 4 INJECTION, SOLUTION INTRA-ARTICULAR; INTRALESIONAL; INTRAMUSCULAR; INTRAVENOUS; SOFT TISSUE at 08:48

## 2020-12-08 RX ADMIN — OXYCODONE HYDROCHLORIDE AND ACETAMINOPHEN SCH MG: 500 TABLET ORAL at 21:20

## 2020-12-08 RX ADMIN — METOPROLOL TARTRATE SCH MG: 25 TABLET, FILM COATED ORAL at 21:19

## 2020-12-08 RX ADMIN — CHOLECALCIFEROL TAB 125 MCG (5000 UNIT) SCH UNIT: 125 TAB at 08:46

## 2020-12-08 RX ADMIN — CEFTRIAXONE SCH MLS/HR: 2 INJECTION, SOLUTION INTRAVENOUS at 16:17

## 2020-12-08 RX ADMIN — ACETAMINOPHEN PRN MG: 325 TABLET, FILM COATED ORAL at 03:56

## 2020-12-08 RX ADMIN — RIVAROXABAN SCH MG: 20 TABLET, FILM COATED ORAL at 08:46

## 2020-12-08 RX ADMIN — INSULIN LISPRO SCH UNITS: 100 INJECTION, SOLUTION INTRAVENOUS; SUBCUTANEOUS at 11:49

## 2020-12-08 RX ADMIN — ACETAMINOPHEN PRN MG: 325 TABLET, FILM COATED ORAL at 18:35

## 2020-12-08 RX ADMIN — ALBUTEROL SULFATE PRN PUFF: 90 AEROSOL, METERED RESPIRATORY (INHALATION) at 21:32

## 2020-12-08 RX ADMIN — ASPIRIN SCH MG: 81 TABLET, COATED ORAL at 08:44

## 2020-12-08 RX ADMIN — METOPROLOL TARTRATE SCH MG: 25 TABLET, FILM COATED ORAL at 08:45

## 2020-12-08 RX ADMIN — CIMETIDINE SCH MG: 200 TABLET, FILM COATED ORAL at 08:45

## 2020-12-08 RX ADMIN — LACTOBACILLUS TAB SCH TAB: TAB at 21:19

## 2020-12-08 RX ADMIN — FENOFIBRATE SCH MG: 145 TABLET, FILM COATED ORAL at 08:46

## 2020-12-08 RX ADMIN — ZINC SULFATE CAP 220 MG (50 MG ELEMENTAL ZN) SCH MG: 220 (50 ZN) CAP at 08:45

## 2020-12-08 RX ADMIN — DOXYCYCLINE HYCLATE SCH MG: 100 TABLET, FILM COATED ORAL at 08:44

## 2020-12-08 RX ADMIN — DOXYCYCLINE HYCLATE SCH MG: 100 TABLET, FILM COATED ORAL at 21:20

## 2020-12-08 RX ADMIN — OXYCODONE HYDROCHLORIDE AND ACETAMINOPHEN SCH MG: 500 TABLET ORAL at 16:17

## 2020-12-08 RX ADMIN — FLUTICASONE FUROATE AND VILANTEROL TRIFENATATE SCH PUFF: 100; 25 POWDER RESPIRATORY (INHALATION) at 08:48

## 2020-12-08 RX ADMIN — GUAIFENESIN AND DEXTROMETHORPHAN SCH ML: 100; 10 SYRUP ORAL at 17:53

## 2020-12-08 RX ADMIN — INSULIN LISPRO SCH UNITS: 100 INJECTION, SOLUTION INTRAVENOUS; SUBCUTANEOUS at 21:28

## 2020-12-08 RX ADMIN — GUAIFENESIN AND DEXTROMETHORPHAN SCH ML: 100; 10 SYRUP ORAL at 11:49

## 2020-12-08 RX ADMIN — LACTOBACILLUS TAB SCH TAB: TAB at 17:57

## 2020-12-08 RX ADMIN — BUTALBITAL, ACETAMINOPHEN, AND CAFFEINE PRN TAB: 50; 325; 40 TABLET ORAL at 00:48

## 2020-12-08 RX ADMIN — INSULIN LISPRO SCH UNITS: 100 INJECTION, SOLUTION INTRAVENOUS; SUBCUTANEOUS at 08:47

## 2020-12-08 RX ADMIN — ATORVASTATIN CALCIUM SCH MG: 80 TABLET, FILM COATED ORAL at 08:46

## 2020-12-08 RX ADMIN — GUAIFENESIN AND DEXTROMETHORPHAN SCH ML: 100; 10 SYRUP ORAL at 21:33

## 2020-12-08 RX ADMIN — OXYCODONE HYDROCHLORIDE AND ACETAMINOPHEN SCH MG: 500 TABLET ORAL at 08:45

## 2020-12-08 RX ADMIN — GUAIFENESIN AND DEXTROMETHORPHAN SCH ML: 100; 10 SYRUP ORAL at 21:34

## 2020-12-08 RX ADMIN — SODIUM CHLORIDE, SODIUM LACTATE, POTASSIUM CHLORIDE, AND CALCIUM CHLORIDE SCH MLS/HR: .6; .31; .03; .02 INJECTION, SOLUTION INTRAVENOUS at 02:32

## 2020-12-08 RX ADMIN — DOCUSATE SODIUM 50MG AND SENNOSIDES 8.6MG SCH TAB: 8.6; 5 TABLET, FILM COATED ORAL at 08:48

## 2020-12-08 RX ADMIN — INSULIN GLARGINE SCH UNITS: 100 INJECTION, SOLUTION SUBCUTANEOUS at 21:29

## 2020-12-08 RX ADMIN — INSULIN GLARGINE SCH UNITS: 100 INJECTION, SOLUTION SUBCUTANEOUS at 08:50

## 2020-12-08 RX ADMIN — ALBUTEROL SULFATE PRN PUFF: 90 AEROSOL, METERED RESPIRATORY (INHALATION) at 05:38

## 2020-12-08 RX ADMIN — HYDROCHLOROTHIAZIDE SCH MG: 12.5 CAPSULE ORAL at 08:44

## 2020-12-08 RX ADMIN — Medication SCH MG: at 21:19

## 2020-12-08 RX ADMIN — LOSARTAN POTASSIUM SCH MG: 50 TABLET, FILM COATED ORAL at 08:45

## 2020-12-08 RX ADMIN — REMDESIVIR SCH MLS/HR: 5 INJECTION INTRAVENOUS at 10:48

## 2020-12-08 RX ADMIN — LOPERAMIDE HYDROCHLORIDE SCH MG: 2 CAPSULE ORAL at 21:20

## 2020-12-08 RX ADMIN — Medication SCH MG: at 08:46

## 2020-12-08 RX ADMIN — GUAIFENESIN SCH MG: 200 TABLET ORAL at 17:57

## 2020-12-08 RX ADMIN — LOPERAMIDE HYDROCHLORIDE SCH MG: 2 CAPSULE ORAL at 17:57

## 2020-12-08 RX ADMIN — INSULIN LISPRO SCH UNITS: 100 INJECTION, SOLUTION INTRAVENOUS; SUBCUTANEOUS at 16:21

## 2020-12-08 RX ADMIN — GUAIFENESIN AND DEXTROMETHORPHAN SCH ML: 100; 10 SYRUP ORAL at 05:46

## 2020-12-09 VITALS — DIASTOLIC BLOOD PRESSURE: 66 MMHG | SYSTOLIC BLOOD PRESSURE: 118 MMHG

## 2020-12-09 VITALS — SYSTOLIC BLOOD PRESSURE: 138 MMHG | DIASTOLIC BLOOD PRESSURE: 77 MMHG

## 2020-12-09 VITALS — DIASTOLIC BLOOD PRESSURE: 71 MMHG | SYSTOLIC BLOOD PRESSURE: 114 MMHG

## 2020-12-09 VITALS — SYSTOLIC BLOOD PRESSURE: 136 MMHG | DIASTOLIC BLOOD PRESSURE: 77 MMHG

## 2020-12-09 LAB
ALBUMIN SERPL-MCNC: 2.1 G/DL (ref 3.4–5)
ALP SERPL-CCNC: 46 U/L (ref 45–117)
ALT SERPL-CCNC: 41 U/L (ref 12–78)
ANION GAP SERPL CALC-SCNC: 4 MMOL/L (ref 5–15)
BASOPHILS # BLD AUTO: 0 X10^3/UL (ref 0–0.1)
BASOPHILS NFR BLD AUTO: 0 % (ref 0–1)
BILIRUB SERPL-MCNC: 0.5 MG/DL (ref 0.2–1)
CALCIUM SERPL-MCNC: 8.4 MG/DL (ref 8.5–10.1)
CHLORIDE SERPL-SCNC: 104 MMOL/L (ref 98–107)
CREAT SERPL-MCNC: 1.29 MG/DL (ref 0.7–1.3)
EOSINOPHIL # BLD AUTO: 0 X10^3/UL (ref 0–0.4)
EOSINOPHIL NFR BLD AUTO: 0 % (ref 1–7)
ERYTHROCYTE [DISTWIDTH] IN BLOOD BY AUTOMATED COUNT: 14.7 % (ref 9.4–14.8)
LYMPHOCYTES # BLD AUTO: 0.6 X10^3/UL (ref 1–3.4)
LYMPHOCYTES NFR BLD AUTO: 6 % (ref 22–44)
MCH RBC QN AUTO: 27.8 PG (ref 27.5–34.5)
MCHC RBC AUTO-ENTMCNC: 33.5 G/DL (ref 33.2–36.2)
MD: NO
MONOCYTES # BLD AUTO: 0.8 X10^3/UL (ref 0.2–0.8)
MONOCYTES NFR BLD AUTO: 8 % (ref 2–9)
NEUTROPHILS # BLD AUTO: 8.3 X10^3/UL (ref 1.8–6.8)
NEUTROPHILS NFR BLD AUTO: 86 % (ref 42–75)
PLATELET # BLD AUTO: 223 X10^3/UL (ref 130–400)
PMV BLD AUTO: 8.1 FL (ref 7.4–10.4)
PROT SERPL-MCNC: 5.6 G/DL (ref 6.4–8.2)
RBC # BLD AUTO: 4.46 X10^6/UL (ref 4.38–5.82)

## 2020-12-09 RX ADMIN — INSULIN LISPRO SCH UNITS: 100 INJECTION, SOLUTION INTRAVENOUS; SUBCUTANEOUS at 09:09

## 2020-12-09 RX ADMIN — LOPERAMIDE HYDROCHLORIDE SCH MG: 2 CAPSULE ORAL at 01:30

## 2020-12-09 RX ADMIN — RIVAROXABAN SCH MG: 20 TABLET, FILM COATED ORAL at 09:14

## 2020-12-09 RX ADMIN — ACETAMINOPHEN PRN MG: 325 TABLET, FILM COATED ORAL at 20:32

## 2020-12-09 RX ADMIN — GUAIFENESIN SCH MG: 200 TABLET ORAL at 05:44

## 2020-12-09 RX ADMIN — GUAIFENESIN SCH MG: 200 TABLET ORAL at 20:23

## 2020-12-09 RX ADMIN — LOPERAMIDE HYDROCHLORIDE SCH MG: 2 CAPSULE ORAL at 05:45

## 2020-12-09 RX ADMIN — LOPERAMIDE HYDROCHLORIDE SCH MG: 2 CAPSULE ORAL at 20:23

## 2020-12-09 RX ADMIN — OXYCODONE HYDROCHLORIDE AND ACETAMINOPHEN SCH MG: 500 TABLET ORAL at 20:24

## 2020-12-09 RX ADMIN — FUROSEMIDE SCH MG: 10 INJECTION, SOLUTION INTRAVENOUS at 15:53

## 2020-12-09 RX ADMIN — GUAIFENESIN SCH MG: 200 TABLET ORAL at 15:53

## 2020-12-09 RX ADMIN — INSULIN LISPRO SCH UNITS: 100 INJECTION, SOLUTION INTRAVENOUS; SUBCUTANEOUS at 20:24

## 2020-12-09 RX ADMIN — ALBUTEROL SULFATE PRN PUFF: 90 AEROSOL, METERED RESPIRATORY (INHALATION) at 05:55

## 2020-12-09 RX ADMIN — INSULIN GLARGINE SCH UNITS: 100 INJECTION, SOLUTION SUBCUTANEOUS at 20:27

## 2020-12-09 RX ADMIN — LACTOBACILLUS TAB SCH TAB: TAB at 05:46

## 2020-12-09 RX ADMIN — DOCUSATE SODIUM 50MG AND SENNOSIDES 8.6MG SCH TAB: 8.6; 5 TABLET, FILM COATED ORAL at 08:30

## 2020-12-09 RX ADMIN — REMDESIVIR SCH MLS/HR: 5 INJECTION INTRAVENOUS at 08:59

## 2020-12-09 RX ADMIN — FLUTICASONE FUROATE AND VILANTEROL TRIFENATATE SCH PUFF: 100; 25 POWDER RESPIRATORY (INHALATION) at 09:00

## 2020-12-09 RX ADMIN — DOXYCYCLINE HYCLATE SCH MG: 100 TABLET, FILM COATED ORAL at 20:23

## 2020-12-09 RX ADMIN — LOPERAMIDE HYDROCHLORIDE SCH MG: 2 CAPSULE ORAL at 11:47

## 2020-12-09 RX ADMIN — CIMETIDINE SCH MG: 200 TABLET, FILM COATED ORAL at 09:13

## 2020-12-09 RX ADMIN — OXYCODONE HYDROCHLORIDE AND ACETAMINOPHEN SCH MG: 500 TABLET ORAL at 09:13

## 2020-12-09 RX ADMIN — ZINC SULFATE CAP 220 MG (50 MG ELEMENTAL ZN) SCH MG: 220 (50 ZN) CAP at 09:13

## 2020-12-09 RX ADMIN — DEXAMETHASONE SODIUM PHOSPHATE SCH MG: 4 INJECTION, SOLUTION INTRA-ARTICULAR; INTRALESIONAL; INTRAMUSCULAR; INTRAVENOUS; SOFT TISSUE at 09:15

## 2020-12-09 RX ADMIN — ASPIRIN SCH MG: 81 TABLET, COATED ORAL at 09:13

## 2020-12-09 RX ADMIN — GUAIFENESIN SCH MG: 200 TABLET ORAL at 11:39

## 2020-12-09 RX ADMIN — CEFTRIAXONE SCH MLS/HR: 2 INJECTION, SOLUTION INTRAVENOUS at 15:52

## 2020-12-09 RX ADMIN — Medication SCH MG: at 20:23

## 2020-12-09 RX ADMIN — CHOLECALCIFEROL TAB 125 MCG (5000 UNIT) SCH UNIT: 125 TAB at 09:14

## 2020-12-09 RX ADMIN — ATORVASTATIN CALCIUM SCH MG: 80 TABLET, FILM COATED ORAL at 09:13

## 2020-12-09 RX ADMIN — LOSARTAN POTASSIUM SCH MG: 50 TABLET, FILM COATED ORAL at 09:14

## 2020-12-09 RX ADMIN — INSULIN LISPRO SCH UNITS: 100 INJECTION, SOLUTION INTRAVENOUS; SUBCUTANEOUS at 16:06

## 2020-12-09 RX ADMIN — BUTALBITAL, ACETAMINOPHEN, AND CAFFEINE PRN TAB: 50; 325; 40 TABLET ORAL at 09:23

## 2020-12-09 RX ADMIN — FENOFIBRATE SCH MG: 145 TABLET, FILM COATED ORAL at 09:13

## 2020-12-09 RX ADMIN — LOPERAMIDE HYDROCHLORIDE SCH MG: 2 CAPSULE ORAL at 15:53

## 2020-12-09 RX ADMIN — HYDROCHLOROTHIAZIDE SCH MG: 12.5 CAPSULE ORAL at 09:14

## 2020-12-09 RX ADMIN — GUAIFENESIN AND DEXTROMETHORPHAN SCH ML: 100; 10 SYRUP ORAL at 11:49

## 2020-12-09 RX ADMIN — METOPROLOL TARTRATE SCH MG: 25 TABLET, FILM COATED ORAL at 20:23

## 2020-12-09 RX ADMIN — METOPROLOL TARTRATE SCH MG: 25 TABLET, FILM COATED ORAL at 09:14

## 2020-12-09 RX ADMIN — INSULIN LISPRO SCH UNITS: 100 INJECTION, SOLUTION INTRAVENOUS; SUBCUTANEOUS at 11:46

## 2020-12-09 RX ADMIN — DOXYCYCLINE HYCLATE SCH MG: 100 TABLET, FILM COATED ORAL at 09:13

## 2020-12-09 RX ADMIN — GUAIFENESIN AND DEXTROMETHORPHAN SCH ML: 100; 10 SYRUP ORAL at 16:08

## 2020-12-09 RX ADMIN — Medication SCH MG: at 09:13

## 2020-12-09 RX ADMIN — INSULIN GLARGINE SCH UNITS: 100 INJECTION, SOLUTION SUBCUTANEOUS at 09:09

## 2020-12-09 RX ADMIN — OXYCODONE HYDROCHLORIDE AND ACETAMINOPHEN SCH MG: 500 TABLET ORAL at 15:53

## 2020-12-10 VITALS — SYSTOLIC BLOOD PRESSURE: 122 MMHG | DIASTOLIC BLOOD PRESSURE: 75 MMHG

## 2020-12-10 VITALS — DIASTOLIC BLOOD PRESSURE: 76 MMHG | SYSTOLIC BLOOD PRESSURE: 138 MMHG

## 2020-12-10 VITALS — DIASTOLIC BLOOD PRESSURE: 74 MMHG | SYSTOLIC BLOOD PRESSURE: 127 MMHG

## 2020-12-10 LAB
ALBUMIN SERPL-MCNC: 2.3 G/DL (ref 3.4–5)
ALP SERPL-CCNC: 71 U/L (ref 45–117)
ALT SERPL-CCNC: 41 U/L (ref 12–78)
ANION GAP SERPL CALC-SCNC: 5 MMOL/L (ref 5–15)
BILIRUB SERPL-MCNC: 0.5 MG/DL (ref 0.2–1)
CALCIUM SERPL-MCNC: 9 MG/DL (ref 8.5–10.1)
CHLORIDE SERPL-SCNC: 105 MMOL/L (ref 98–107)
CREAT SERPL-MCNC: 1.27 MG/DL (ref 0.7–1.3)
PROT SERPL-MCNC: 6.5 G/DL (ref 6.4–8.2)

## 2020-12-10 RX ADMIN — OXYCODONE HYDROCHLORIDE AND ACETAMINOPHEN SCH MG: 500 TABLET ORAL at 08:50

## 2020-12-10 RX ADMIN — ATORVASTATIN CALCIUM SCH MG: 80 TABLET, FILM COATED ORAL at 08:50

## 2020-12-10 RX ADMIN — LOPERAMIDE HYDROCHLORIDE SCH MG: 2 CAPSULE ORAL at 08:50

## 2020-12-10 RX ADMIN — METOPROLOL TARTRATE SCH MG: 25 TABLET, FILM COATED ORAL at 08:50

## 2020-12-10 RX ADMIN — Medication SCH MG: at 21:00

## 2020-12-10 RX ADMIN — RIVAROXABAN SCH MG: 20 TABLET, FILM COATED ORAL at 08:50

## 2020-12-10 RX ADMIN — INSULIN LISPRO SCH UNITS: 100 INJECTION, SOLUTION INTRAVENOUS; SUBCUTANEOUS at 08:50

## 2020-12-10 RX ADMIN — LOPERAMIDE HYDROCHLORIDE SCH MG: 2 CAPSULE ORAL at 17:23

## 2020-12-10 RX ADMIN — INSULIN LISPRO SCH UNITS: 100 INJECTION, SOLUTION INTRAVENOUS; SUBCUTANEOUS at 12:35

## 2020-12-10 RX ADMIN — DOXYCYCLINE HYCLATE SCH MG: 100 TABLET, FILM COATED ORAL at 08:50

## 2020-12-10 RX ADMIN — FENOFIBRATE SCH MG: 145 TABLET, FILM COATED ORAL at 08:50

## 2020-12-10 RX ADMIN — ASPIRIN SCH MG: 81 TABLET, COATED ORAL at 08:50

## 2020-12-10 RX ADMIN — LOPERAMIDE HYDROCHLORIDE SCH MG: 2 CAPSULE ORAL at 00:45

## 2020-12-10 RX ADMIN — LOPERAMIDE HYDROCHLORIDE SCH MG: 2 CAPSULE ORAL at 05:13

## 2020-12-10 RX ADMIN — FLUTICASONE FUROATE AND VILANTEROL TRIFENATATE SCH PUFF: 100; 25 POWDER RESPIRATORY (INHALATION) at 08:50

## 2020-12-10 RX ADMIN — GUAIFENESIN AND DEXTROMETHORPHAN SCH ML: 100; 10 SYRUP ORAL at 00:45

## 2020-12-10 RX ADMIN — DEXAMETHASONE SODIUM PHOSPHATE SCH MG: 4 INJECTION, SOLUTION INTRA-ARTICULAR; INTRALESIONAL; INTRAMUSCULAR; INTRAVENOUS; SOFT TISSUE at 08:50

## 2020-12-10 RX ADMIN — ZINC SULFATE CAP 220 MG (50 MG ELEMENTAL ZN) SCH MG: 220 (50 ZN) CAP at 08:50

## 2020-12-10 RX ADMIN — LOPERAMIDE HYDROCHLORIDE SCH MG: 2 CAPSULE ORAL at 20:00

## 2020-12-10 RX ADMIN — FUROSEMIDE SCH MG: 10 INJECTION, SOLUTION INTRAVENOUS at 08:50

## 2020-12-10 RX ADMIN — Medication SCH MG: at 08:50

## 2020-12-10 RX ADMIN — LOPERAMIDE HYDROCHLORIDE SCH MG: 2 CAPSULE ORAL at 12:33

## 2020-12-10 RX ADMIN — BUTALBITAL, ACETAMINOPHEN, AND CAFFEINE PRN TAB: 50; 325; 40 TABLET ORAL at 01:40

## 2020-12-10 RX ADMIN — DOCUSATE SODIUM 50MG AND SENNOSIDES 8.6MG SCH TAB: 8.6; 5 TABLET, FILM COATED ORAL at 09:00

## 2020-12-10 RX ADMIN — GUAIFENESIN SCH MG: 200 TABLET ORAL at 05:13

## 2020-12-10 RX ADMIN — CHOLECALCIFEROL TAB 125 MCG (5000 UNIT) SCH UNIT: 125 TAB at 08:50

## 2020-12-10 RX ADMIN — GUAIFENESIN AND DEXTROMETHORPHAN SCH ML: 100; 10 SYRUP ORAL at 15:50

## 2020-12-10 RX ADMIN — LOSARTAN POTASSIUM SCH MG: 50 TABLET, FILM COATED ORAL at 08:50

## 2020-12-10 RX ADMIN — INSULIN GLARGINE SCH UNITS: 100 INJECTION, SOLUTION SUBCUTANEOUS at 21:00

## 2020-12-10 RX ADMIN — INSULIN LISPRO SCH UNITS: 100 INJECTION, SOLUTION INTRAVENOUS; SUBCUTANEOUS at 17:39

## 2020-12-10 RX ADMIN — INSULIN LISPRO SCH UNITS: 100 INJECTION, SOLUTION INTRAVENOUS; SUBCUTANEOUS at 21:00

## 2020-12-10 RX ADMIN — FUROSEMIDE SCH MG: 10 INJECTION, SOLUTION INTRAVENOUS at 17:24

## 2020-12-10 RX ADMIN — METOPROLOL TARTRATE SCH MG: 25 TABLET, FILM COATED ORAL at 21:00

## 2020-12-10 RX ADMIN — HYDROCHLOROTHIAZIDE SCH MG: 12.5 CAPSULE ORAL at 08:50

## 2020-12-10 RX ADMIN — GUAIFENESIN AND DEXTROMETHORPHAN SCH ML: 100; 10 SYRUP ORAL at 04:31

## 2020-12-10 RX ADMIN — DOXYCYCLINE HYCLATE SCH MG: 100 TABLET, FILM COATED ORAL at 21:00

## 2020-12-10 RX ADMIN — GUAIFENESIN SCH MG: 200 TABLET ORAL at 21:00

## 2020-12-10 RX ADMIN — OXYCODONE HYDROCHLORIDE AND ACETAMINOPHEN SCH MG: 500 TABLET ORAL at 17:23

## 2020-12-10 RX ADMIN — GUAIFENESIN SCH MG: 200 TABLET ORAL at 17:23

## 2020-12-10 RX ADMIN — OXYCODONE HYDROCHLORIDE AND ACETAMINOPHEN SCH MG: 500 TABLET ORAL at 21:00

## 2020-12-10 RX ADMIN — CIMETIDINE SCH MG: 200 TABLET, FILM COATED ORAL at 08:50

## 2020-12-10 RX ADMIN — GUAIFENESIN SCH MG: 200 TABLET ORAL at 12:33

## 2020-12-10 RX ADMIN — INSULIN GLARGINE SCH UNITS: 100 INJECTION, SOLUTION SUBCUTANEOUS at 08:50

## 2020-12-10 RX ADMIN — CEFTRIAXONE SCH MLS/HR: 2 INJECTION, SOLUTION INTRAVENOUS at 17:23

## 2020-12-10 RX ADMIN — GUAIFENESIN AND DEXTROMETHORPHAN SCH ML: 100; 10 SYRUP ORAL at 11:13

## 2020-12-11 PROCEDURE — 5A12012 PERFORMANCE OF CARDIAC OUTPUT, SINGLE, MANUAL: ICD-10-PCS | Performed by: INTERNAL MEDICINE
